# Patient Record
Sex: FEMALE | Race: AMERICAN INDIAN OR ALASKA NATIVE | NOT HISPANIC OR LATINO | Employment: UNEMPLOYED | URBAN - NONMETROPOLITAN AREA
[De-identification: names, ages, dates, MRNs, and addresses within clinical notes are randomized per-mention and may not be internally consistent; named-entity substitution may affect disease eponyms.]

---
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
## 2023-01-03 ENCOUNTER — HOSPITAL ENCOUNTER (INPATIENT)
Facility: MEDICAL CENTER | Age: 18
LOS: 1 days | DRG: 694 | End: 2023-01-04
Attending: HOSPITALIST | Admitting: HOSPITALIST
Payer: MEDICAID

## 2023-01-03 DIAGNOSIS — N20.1 URETEROLITHIASIS: ICD-10-CM

## 2023-01-03 PROCEDURE — 770001 HCHG ROOM/CARE - MED/SURG/GYN PRIV*

## 2023-01-03 PROCEDURE — 99223 1ST HOSP IP/OBS HIGH 75: CPT | Performed by: HOSPITALIST

## 2023-01-03 RX ORDER — SODIUM CHLORIDE, SODIUM LACTATE, POTASSIUM CHLORIDE, CALCIUM CHLORIDE 600; 310; 30; 20 MG/100ML; MG/100ML; MG/100ML; MG/100ML
INJECTION, SOLUTION INTRAVENOUS CONTINUOUS
Status: DISCONTINUED | OUTPATIENT
Start: 2023-01-04 | End: 2023-01-04 | Stop reason: HOSPADM

## 2023-01-03 RX ORDER — PROCHLORPERAZINE EDISYLATE 5 MG/ML
5-10 INJECTION INTRAMUSCULAR; INTRAVENOUS EVERY 4 HOURS PRN
Status: DISCONTINUED | OUTPATIENT
Start: 2023-01-03 | End: 2023-01-04 | Stop reason: HOSPADM

## 2023-01-03 RX ORDER — POLYETHYLENE GLYCOL 3350 17 G/17G
1 POWDER, FOR SOLUTION ORAL
Status: DISCONTINUED | OUTPATIENT
Start: 2023-01-03 | End: 2023-01-04 | Stop reason: HOSPADM

## 2023-01-03 RX ORDER — AMOXICILLIN 250 MG
2 CAPSULE ORAL 2 TIMES DAILY
Status: DISCONTINUED | OUTPATIENT
Start: 2023-01-04 | End: 2023-01-04 | Stop reason: HOSPADM

## 2023-01-03 RX ORDER — ONDANSETRON 4 MG/1
4 TABLET, ORALLY DISINTEGRATING ORAL EVERY 4 HOURS PRN
Status: DISCONTINUED | OUTPATIENT
Start: 2023-01-03 | End: 2023-01-04 | Stop reason: HOSPADM

## 2023-01-03 RX ORDER — ONDANSETRON 2 MG/ML
4 INJECTION INTRAMUSCULAR; INTRAVENOUS EVERY 4 HOURS PRN
Status: DISCONTINUED | OUTPATIENT
Start: 2023-01-03 | End: 2023-01-04 | Stop reason: HOSPADM

## 2023-01-03 RX ORDER — TAMSULOSIN HYDROCHLORIDE 0.4 MG/1
0.4 CAPSULE ORAL
Status: DISCONTINUED | OUTPATIENT
Start: 2023-01-04 | End: 2023-01-04 | Stop reason: HOSPADM

## 2023-01-03 RX ORDER — PROMETHAZINE HYDROCHLORIDE 25 MG/1
12.5-25 TABLET ORAL EVERY 4 HOURS PRN
Status: DISCONTINUED | OUTPATIENT
Start: 2023-01-03 | End: 2023-01-04 | Stop reason: HOSPADM

## 2023-01-03 RX ORDER — PROMETHAZINE HYDROCHLORIDE 12.5 MG/1
12.5-25 SUPPOSITORY RECTAL EVERY 4 HOURS PRN
Status: DISCONTINUED | OUTPATIENT
Start: 2023-01-03 | End: 2023-01-04 | Stop reason: HOSPADM

## 2023-01-03 RX ORDER — ACETAMINOPHEN 325 MG/1
650 TABLET ORAL EVERY 6 HOURS PRN
Status: DISCONTINUED | OUTPATIENT
Start: 2023-01-03 | End: 2023-01-04 | Stop reason: HOSPADM

## 2023-01-03 RX ORDER — BISACODYL 10 MG
10 SUPPOSITORY, RECTAL RECTAL
Status: DISCONTINUED | OUTPATIENT
Start: 2023-01-03 | End: 2023-01-04 | Stop reason: HOSPADM

## 2023-01-03 ASSESSMENT — ENCOUNTER SYMPTOMS
VOMITING: 0
FEVER: 0
CHILLS: 0
NAUSEA: 1
FLANK PAIN: 1

## 2023-01-03 ASSESSMENT — COGNITIVE AND FUNCTIONAL STATUS - GENERAL
SUGGESTED CMS G CODE MODIFIER MOBILITY: CH
DAILY ACTIVITIY SCORE: 24
SUGGESTED CMS G CODE MODIFIER DAILY ACTIVITY: CH
MOBILITY SCORE: 24

## 2023-01-03 ASSESSMENT — LIFESTYLE VARIABLES
ALCOHOL_USE: NO
ON A TYPICAL DAY WHEN YOU DRINK ALCOHOL HOW MANY DRINKS DO YOU HAVE: 0
AVERAGE NUMBER OF DAYS PER WEEK YOU HAVE A DRINK CONTAINING ALCOHOL: 0
CONSUMPTION TOTAL: NEGATIVE
HAVE PEOPLE ANNOYED YOU BY CRITICIZING YOUR DRINKING: NO
EVER HAD A DRINK FIRST THING IN THE MORNING TO STEADY YOUR NERVES TO GET RID OF A HANGOVER: NO
EVER FELT BAD OR GUILTY ABOUT YOUR DRINKING: NO
TOTAL SCORE: 0
HAVE YOU EVER FELT YOU SHOULD CUT DOWN ON YOUR DRINKING: NO
HOW MANY TIMES IN THE PAST YEAR HAVE YOU HAD 5 OR MORE DRINKS IN A DAY: 0

## 2023-01-03 ASSESSMENT — PATIENT HEALTH QUESTIONNAIRE - PHQ9
1. LITTLE INTEREST OR PLEASURE IN DOING THINGS: NOT AT ALL
SUM OF ALL RESPONSES TO PHQ9 QUESTIONS 1 AND 2: 0
2. FEELING DOWN, DEPRESSED, IRRITABLE, OR HOPELESS: NOT AT ALL

## 2023-01-04 ENCOUNTER — APPOINTMENT (OUTPATIENT)
Dept: RADIOLOGY | Facility: MEDICAL CENTER | Age: 18
DRG: 694 | End: 2023-01-04
Attending: HOSPITALIST
Payer: MEDICAID

## 2023-01-04 ENCOUNTER — HOSPITAL ENCOUNTER (OUTPATIENT)
Dept: RADIOLOGY | Facility: MEDICAL CENTER | Age: 18
End: 2023-01-04

## 2023-01-04 ENCOUNTER — APPOINTMENT (OUTPATIENT)
Dept: RADIOLOGY | Facility: MEDICAL CENTER | Age: 18
DRG: 694 | End: 2023-01-04
Attending: INTERNAL MEDICINE
Payer: MEDICAID

## 2023-01-04 VITALS
TEMPERATURE: 97.8 F | OXYGEN SATURATION: 97 % | BODY MASS INDEX: 27.72 KG/M2 | RESPIRATION RATE: 18 BRPM | SYSTOLIC BLOOD PRESSURE: 110 MMHG | WEIGHT: 176.59 LBS | HEIGHT: 67 IN | HEART RATE: 72 BPM | DIASTOLIC BLOOD PRESSURE: 56 MMHG

## 2023-01-04 PROBLEM — N20.1 URETEROLITHIASIS: Status: RESOLVED | Noted: 2023-01-03 | Resolved: 2023-01-04

## 2023-01-04 LAB
ALBUMIN SERPL BCP-MCNC: 4.1 G/DL (ref 3.2–4.9)
ALBUMIN/GLOB SERPL: 1.8 G/DL
ALP SERPL-CCNC: 159 U/L (ref 30–99)
ALT SERPL-CCNC: 19 U/L (ref 2–50)
ANION GAP SERPL CALC-SCNC: 10 MMOL/L (ref 7–16)
APPEARANCE UR: CLEAR
AST SERPL-CCNC: 14 U/L (ref 12–45)
BACTERIA #/AREA URNS HPF: NEGATIVE /HPF
BASOPHILS # BLD AUTO: 0.8 % (ref 0–1.8)
BASOPHILS # BLD: 0.07 K/UL (ref 0–0.12)
BILIRUB SERPL-MCNC: 0.5 MG/DL (ref 0.1–1.5)
BILIRUB UR QL STRIP.AUTO: NEGATIVE
BUN SERPL-MCNC: 7 MG/DL (ref 8–22)
CALCIUM ALBUM COR SERPL-MCNC: 9 MG/DL (ref 8.5–10.5)
CALCIUM SERPL-MCNC: 9.1 MG/DL (ref 8.5–10.5)
CHLORIDE SERPL-SCNC: 106 MMOL/L (ref 96–112)
CO2 SERPL-SCNC: 23 MMOL/L (ref 20–33)
COLOR UR: YELLOW
CREAT SERPL-MCNC: 0.55 MG/DL (ref 0.5–1.4)
EOSINOPHIL # BLD AUTO: 0.08 K/UL (ref 0–0.51)
EOSINOPHIL NFR BLD: 0.9 % (ref 0–6.9)
EPI CELLS #/AREA URNS HPF: NEGATIVE /HPF
ERYTHROCYTE [DISTWIDTH] IN BLOOD BY AUTOMATED COUNT: 42 FL (ref 35.9–50)
GFR SERPLBLD CREATININE-BSD FMLA CKD-EPI: 135 ML/MIN/1.73 M 2
GLOBULIN SER CALC-MCNC: 2.3 G/DL (ref 1.9–3.5)
GLUCOSE SERPL-MCNC: 88 MG/DL (ref 65–99)
GLUCOSE UR STRIP.AUTO-MCNC: NEGATIVE MG/DL
HCT VFR BLD AUTO: 38.2 % (ref 37–47)
HGB BLD-MCNC: 13 G/DL (ref 12–16)
HYALINE CASTS #/AREA URNS LPF: ABNORMAL /LPF
IMM GRANULOCYTES # BLD AUTO: 0.04 K/UL (ref 0–0.11)
IMM GRANULOCYTES NFR BLD AUTO: 0.4 % (ref 0–0.9)
KETONES UR STRIP.AUTO-MCNC: NEGATIVE MG/DL
LEUKOCYTE ESTERASE UR QL STRIP.AUTO: NEGATIVE
LYMPHOCYTES # BLD AUTO: 1.85 K/UL (ref 1–4.8)
LYMPHOCYTES NFR BLD: 20.6 % (ref 22–41)
MCH RBC QN AUTO: 28.8 PG (ref 27–33)
MCHC RBC AUTO-ENTMCNC: 34 G/DL (ref 33.6–35)
MCV RBC AUTO: 84.5 FL (ref 81.4–97.8)
MICRO URNS: ABNORMAL
MONOCYTES # BLD AUTO: 0.47 K/UL (ref 0–0.85)
MONOCYTES NFR BLD AUTO: 5.2 % (ref 0–13.4)
NEUTROPHILS # BLD AUTO: 6.45 K/UL (ref 2–7.15)
NEUTROPHILS NFR BLD: 72.1 % (ref 44–72)
NITRITE UR QL STRIP.AUTO: NEGATIVE
NRBC # BLD AUTO: 0 K/UL
NRBC BLD-RTO: 0 /100 WBC
PH UR STRIP.AUTO: 6 [PH] (ref 5–8)
PLATELET # BLD AUTO: 323 K/UL (ref 164–446)
PMV BLD AUTO: 9.3 FL (ref 9–12.9)
POTASSIUM SERPL-SCNC: 3.8 MMOL/L (ref 3.6–5.5)
PROT SERPL-MCNC: 6.4 G/DL (ref 6–8.2)
PROT UR QL STRIP: NEGATIVE MG/DL
RBC # BLD AUTO: 4.52 M/UL (ref 4.2–5.4)
RBC # URNS HPF: ABNORMAL /HPF
RBC UR QL AUTO: ABNORMAL
SODIUM SERPL-SCNC: 139 MMOL/L (ref 135–145)
SP GR UR STRIP.AUTO: 1.01
UROBILINOGEN UR STRIP.AUTO-MCNC: 0.2 MG/DL
WBC # BLD AUTO: 9 K/UL (ref 4.8–10.8)
WBC #/AREA URNS HPF: ABNORMAL /HPF

## 2023-01-04 PROCEDURE — 700105 HCHG RX REV CODE 258: Performed by: HOSPITALIST

## 2023-01-04 PROCEDURE — 99239 HOSP IP/OBS DSCHRG MGMT >30: CPT | Performed by: INTERNAL MEDICINE

## 2023-01-04 PROCEDURE — 80053 COMPREHEN METABOLIC PANEL: CPT

## 2023-01-04 PROCEDURE — 81001 URINALYSIS AUTO W/SCOPE: CPT

## 2023-01-04 PROCEDURE — 74018 RADEX ABDOMEN 1 VIEW: CPT

## 2023-01-04 PROCEDURE — 76775 US EXAM ABDO BACK WALL LIM: CPT

## 2023-01-04 PROCEDURE — 85025 COMPLETE CBC W/AUTO DIFF WBC: CPT

## 2023-01-04 PROCEDURE — 36415 COLL VENOUS BLD VENIPUNCTURE: CPT

## 2023-01-04 PROCEDURE — 700102 HCHG RX REV CODE 250 W/ 637 OVERRIDE(OP): Performed by: HOSPITALIST

## 2023-01-04 PROCEDURE — A9270 NON-COVERED ITEM OR SERVICE: HCPCS | Performed by: HOSPITALIST

## 2023-01-04 RX ORDER — IBUPROFEN 600 MG/1
600 TABLET ORAL EVERY 6 HOURS PRN
Qty: 30 TABLET | Refills: 0 | Status: SHIPPED | OUTPATIENT
Start: 2023-01-04

## 2023-01-04 RX ORDER — TAMSULOSIN HYDROCHLORIDE 0.4 MG/1
0.4 CAPSULE ORAL
Qty: 10 CAPSULE | Refills: 0 | Status: SHIPPED | OUTPATIENT
Start: 2023-01-05

## 2023-01-04 RX ADMIN — TAMSULOSIN HYDROCHLORIDE 0.4 MG: 0.4 CAPSULE ORAL at 08:25

## 2023-01-04 RX ADMIN — SODIUM CHLORIDE, POTASSIUM CHLORIDE, SODIUM LACTATE AND CALCIUM CHLORIDE: 600; 310; 30; 20 INJECTION, SOLUTION INTRAVENOUS at 00:11

## 2023-01-04 RX ADMIN — SODIUM CHLORIDE, POTASSIUM CHLORIDE, SODIUM LACTATE AND CALCIUM CHLORIDE: 600; 310; 30; 20 INJECTION, SOLUTION INTRAVENOUS at 08:26

## 2023-01-04 NOTE — PROGRESS NOTES
Ms. Baldwin has a 5.6 cm right mid-ureteral stone with hydronephrosis. She apparently took pyridium and her UA is unable to be processed by the lab. Her WBC is 10.3. She is being given abx and pain meds as well as IV fluids. She will be sent for urology consult. I discussed with Dr. Michaels the ER doc in [place].

## 2023-01-04 NOTE — ASSESSMENT & PLAN NOTE
CT in [place] revealed a 5.3 mm partially obstructing stone.  UA there was not able to be read (lab error?) and will be repeated here. She was given IV antibiotics in [place].  If the UA is positive here then antibiotics will be continued.  She currently doesn't have any right flank pain which is suspicious for a passed stone.   Her urine will be filtered to see if she passes the stone  flomax   Renal ultrasound ordered to eval  NPO midnight in case the stone needs to be extracted by urology who will be consulted in the morning.  IV fluids

## 2023-01-04 NOTE — H&P
Hospital Medicine History & Physical Note    Date of Service  1/3/2023    Primary Care Physician  Pcp Pt States None    Consultants  None.  Urology to be consulted in the morning    Code Status  Full Code    Chief Complaint  ight flank pain.    History of Presenting Illness  Michael Baldwin is a 17 y.o. female who presented 1/3/2023 with right flank pain.  Ms. Baldwin has no previously known medical conditions that has been her usual state of excellent health till this afternoon around 2 or 3 she developed cute onset of right flank pain.  She went to the emergency room in [facility] where a CT scan revealed a partially obstructing 5.38 mm right distal ureteral stone.  Her white blood cell count was 10.3 and her creatinine was 0.67.  Her beta hCG was negative.  A urinalysis was done there and apparently because of a lab malfunction was not able to be read.  They empirically gave her IV antibiotics and she required substantial amounts of IV narcotics for pain control.  She was transferred here for higher level care and possible urology consultation for stone extraction.  When Ms. Baldwin arrived here she has no right flank pain and no tenderness to percussion on the right side.  This is suspicious for a passed stone.  We will repeat her urinalysis here and consider antibiotics if indicated.  She will be treated with IV fluids and a renal ultrasound has been ordered to evaluate if the stone remains.      I discussed the plan of care with bedside RN.    Review of Systems  Review of Systems   Constitutional:  Negative for chills and fever.   Gastrointestinal:  Positive for nausea. Negative for vomiting.   Genitourinary:  Positive for flank pain. Negative for dysuria, frequency, hematuria and urgency.   All other systems reviewed and are negative.    Past Medical History  She has no known medical conditions    Surgical History  [place] teeth removal    Family History  No family history of kidney stones to her  knowledge    Social History   reports that she has never smoked. She has never used smokeless tobacco.    Allergies  No Known Allergies    Medications  None       Physical Exam  Temp:  [36.4 °C (97.5 °F)] 36.4 °C (97.5 °F)  Pulse:  [60] 60  Resp:  [18] 18  BP: (115)/(59) 115/59  SpO2:  [98 %] 98 %  Blood Pressure: 115/59   Temperature: 36.4 °C (97.5 °F)   Pulse: 60   Respiration: 18   Pulse Oximetry: 98 %       Physical Exam  Vitals and nursing note reviewed.   Constitutional:       General: She is not in acute distress.     Appearance: She is not ill-appearing or toxic-appearing.   HENT:      Head: Normocephalic and atraumatic.      Mouth/Throat:      Mouth: Mucous membranes are dry.      Pharynx: Oropharynx is clear.   Eyes:      General: No scleral icterus.     Conjunctiva/sclera: Conjunctivae normal.   Cardiovascular:      Rate and Rhythm: Normal rate and regular rhythm.      Heart sounds: No murmur heard.  Pulmonary:      Effort: Pulmonary effort is normal.      Breath sounds: Normal breath sounds.   Abdominal:      General: There is no distension.      Tenderness: There is no abdominal tenderness.      Comments: No flank pain to percussion   Musculoskeletal:      Cervical back: Normal range of motion and neck supple.      Right lower leg: No edema.      Left lower leg: No edema.   Skin:     General: Skin is warm and dry.   Neurological:      General: No focal deficit present.      Mental Status: She is alert and oriented to person, place, and time.   Psychiatric:         Mood and Affect: Mood normal.         Behavior: Behavior normal.         Thought Content: Thought content normal.         Judgment: Judgment normal.       Laboratory:          No results for input(s): ALTSGPT, ASTSGOT, ALKPHOSPHAT, TBILIRUBIN, DBILIRUBIN, GAMMAGT, AMYLASE, LIPASE, ALB, PREALBUMIN, GLUCOSE in the last 72 hours.      No results for input(s): NTPROBNP in the last 72 hours.      No results for input(s): TROPONINT in the last 72  hours.    Imaging:  US-RENAL    (Results Pending)       \    Assessment/Plan:  Justification for Admission Status  I anticipate this patient will require at least two midnights for appropriate medical management, necessitating inpatient admission because further work-up for ureteral stone with hydronephrosis possible UTI    Patient will need a Med/Surg bed on MEDICAL service .  The need is secondary to as above.    * Ureterolithiasis- (present on admission)  Assessment & Plan  CT in [place] revealed a 5.3 mm partially obstructing stone.  UA there was not able to be read (lab error?) and will be repeated here. She was given IV antibiotics in [place].  If the UA is positive here then antibiotics will be continued.  She currently doesn't have any right flank pain which is suspicious for a passed stone.   Her urine will be filtered to see if she passes the stone  flomax   Renal ultrasound ordered to eval  NPO midnight in case the stone needs to be extracted by urology who will be consulted in the morning.  IV fluids        VTE prophylaxis: SCDs/TEDs

## 2023-01-04 NOTE — PROGRESS NOTES
Received bedside report from night shift RN.   Assumed care of patient at change of shift.   Assessment complete and POC discussed.   STATUS: Patient is A&Ox4, VSS, on RA.   PAIN: Patient denies pain, no apparent signs of distress or discomfort.   Patient issitting up in bed, watching videos on her phone.   Bed is in lowest/locked position.   Call light and belongings are within reach.   No further needs at this time.  Will continue to monitor.

## 2023-01-04 NOTE — PROGRESS NOTES
4 Eyes Skin Assessment Completed by LANDEN bustillos and LANDEN bailey.    Head WDL  Ears WDL  Nose WDL  Mouth WDL  Neck WDL  Breast/Chest WDL  Shoulder Blades WDL  Spine WDL  (R) Arm/Elbow/Hand WDL  (L) Arm/Elbow/Hand WDL  Abdomen WDL  Groin WDL  Scrotum/Coccyx/Buttocks WDL  (R) Leg WDL  (L) Leg WDL  (R) Heel/Foot/Toe WDL  (L) Heel/Foot/Toe WDL          Devices In Places       Interventions In Place Pillows    Possible Skin Injury No    Pictures Uploaded Into Epic N/A  Wound Consult Placed N/A  RN Wound Prevention Protocol Ordered No

## 2023-01-04 NOTE — PROGRESS NOTES
Direct admit from [facility] via gurney and randal.    Alert and able to let her needs known, up self with a steady gait.  No current pain but did get medicated prior to arrival  Denies painful urination and unable to state color of urine  Notified dr garcia of arrival, await orders

## 2023-01-04 NOTE — CARE PLAN
The patient is Stable - Low risk of patient condition declining or worsening    Shift Goals  Clinical Goals: IVF, monitor for ABD pain  Patient Goals: feel better, go home    Progress made toward(s) clinical / shift goals:  yes    Patient is not progressing towards the following goals:

## 2023-01-04 NOTE — DISCHARGE SUMMARY
Discharge Summary    CHIEF COMPLAINT ON ADMISSION  No chief complaint on file.      Reason for Admission  Nephrolithiasis     Admission Date  1/3/2023    CODE STATUS  Full Code    HPI & HOSPITAL COURSE  This is a 17 y.o. female here with right flank pain.  She was seen at the ER in [place] where she underwent a CT scan that revealed a partially obstructing 5.38 mm right distal ureteral stone.  Her white cell count was 10.3 and her kidney function was within normal limits.  Her beta hCG was negative.  She was given empiric IV antibiotics and IV narcotics and transferred to [facility] for urology consultation.  On arrival to [facility] patient's right flank pain had resolved.  She underwent a UA here which was negative for infection.  She underwent a renal ultrasound that revealed no significant hydronephrosis.  She underwent a x-ray KUB that did not reveal previously described right ureteral stone.  Patient symptoms have resolved and she is stable.  She will be discharged home with close outpatient follow-up with her PCP.  She has been given a 10-day course of Flomax to help with expulsion of the stone.      Therefore, she is discharged in good and stable condition to home with close outpatient follow-up.    The patient recovered much more quickly than anticipated on admission.    Discharge Date  1/4/23    FOLLOW UP ITEMS POST DISCHARGE  Follow up with PCP    DISCHARGE DIAGNOSES  Principal Problem (Resolved):    Ureterolithiasis POA: Yes  Active Problems:    * No active hospital problems. *      FOLLOW UP  No future appointments.  No follow-up provider specified.    MEDICATIONS ON DISCHARGE     Medication List        START taking these medications        Instructions   ibuprofen 600 MG Tabs  Commonly known as: MOTRIN   Take 1 Tablet by mouth every 6 hours as needed for Mild Pain.  Dose: 600 mg     tamsulosin 0.4 MG capsule  Start taking on: January 5, 2023  Commonly known as: FLOMAX   Take 1 Capsule by mouth 1/2 hour after  breakfast.  Dose: 0.4 mg              Allergies  No Known Allergies    DIET  Orders Placed This Encounter   Procedures    Diet NPO Restrict to: Sips with Medications     Standing Status:   Standing     Number of Occurrences:   8     Order Specific Question:   Diet NPO Restrict to:     Answer:   Sips with Medications [3]       ACTIVITY  As tolerated.  Weight bearing as tolerated    CONSULTATIONS  none    PROCEDURES  none    LABORATORY  Lab Results   Component Value Date    SODIUM 139 01/04/2023    POTASSIUM 3.8 01/04/2023    CHLORIDE 106 01/04/2023    CO2 23 01/04/2023    GLUCOSE 88 01/04/2023    BUN 7 (L) 01/04/2023    CREATININE 0.55 01/04/2023        Lab Results   Component Value Date    WBC 9.0 01/04/2023    HEMOGLOBIN 13.0 01/04/2023    HEMATOCRIT 38.2 01/04/2023    PLATELETCT 323 01/04/2023        Total time of the discharge process exceeds 34 minutes.

## 2024-04-20 ENCOUNTER — ANESTHESIA (OUTPATIENT)
Dept: ANESTHESIOLOGY | Facility: MEDICAL CENTER | Age: 19
End: 2024-04-20
Payer: MEDICAID

## 2024-04-20 ENCOUNTER — ANESTHESIA EVENT (OUTPATIENT)
Dept: ANESTHESIOLOGY | Facility: MEDICAL CENTER | Age: 19
End: 2024-04-20
Payer: MEDICAID

## 2024-04-20 ENCOUNTER — HOSPITAL ENCOUNTER (INPATIENT)
Facility: MEDICAL CENTER | Age: 19
LOS: 2 days | End: 2024-04-22
Attending: OBSTETRICS & GYNECOLOGY | Admitting: OBSTETRICS & GYNECOLOGY
Payer: MEDICAID

## 2024-04-20 LAB
BASOPHILS # BLD AUTO: 0.3 % (ref 0–1.8)
BASOPHILS # BLD: 0.05 K/UL (ref 0–0.12)
EOSINOPHIL # BLD AUTO: 0.12 K/UL (ref 0–0.51)
EOSINOPHIL NFR BLD: 0.7 % (ref 0–6.9)
ERYTHROCYTE [DISTWIDTH] IN BLOOD BY AUTOMATED COUNT: 53.3 FL (ref 35.9–50)
HCT VFR BLD AUTO: 37.1 % (ref 37–47)
HGB BLD-MCNC: 12.2 G/DL (ref 12–16)
HOLDING TUBE BB 8507: NORMAL
IMM GRANULOCYTES # BLD AUTO: 0.07 K/UL (ref 0–0.11)
IMM GRANULOCYTES NFR BLD AUTO: 0.4 % (ref 0–0.9)
LYMPHOCYTES # BLD AUTO: 1.28 K/UL (ref 1–4.8)
LYMPHOCYTES NFR BLD: 7.4 % (ref 22–41)
MCH RBC QN AUTO: 27.4 PG (ref 27–33)
MCHC RBC AUTO-ENTMCNC: 32.9 G/DL (ref 32.2–35.5)
MCV RBC AUTO: 83.4 FL (ref 81.4–97.8)
MONOCYTES # BLD AUTO: 0.83 K/UL (ref 0–0.85)
MONOCYTES NFR BLD AUTO: 4.8 % (ref 0–13.4)
NEUTROPHILS # BLD AUTO: 14.96 K/UL (ref 1.82–7.42)
NEUTROPHILS NFR BLD: 86.4 % (ref 44–72)
NRBC # BLD AUTO: 0 K/UL
NRBC BLD-RTO: 0 /100 WBC (ref 0–0.2)
PLATELET # BLD AUTO: 296 K/UL (ref 164–446)
PMV BLD AUTO: 9.7 FL (ref 9–12.9)
RBC # BLD AUTO: 4.45 M/UL (ref 4.2–5.4)
T PALLIDUM AB SER QL IA: NORMAL
WBC # BLD AUTO: 17.3 K/UL (ref 4.8–10.8)

## 2024-04-20 PROCEDURE — 770002 HCHG ROOM/CARE - OB PRIVATE (112)

## 2024-04-20 PROCEDURE — 99282 EMERGENCY DEPT VISIT SF MDM: CPT

## 2024-04-20 PROCEDURE — 86780 TREPONEMA PALLIDUM: CPT

## 2024-04-20 PROCEDURE — 700101 HCHG RX REV CODE 250: Performed by: ANESTHESIOLOGY

## 2024-04-20 PROCEDURE — 36415 COLL VENOUS BLD VENIPUNCTURE: CPT

## 2024-04-20 PROCEDURE — 700111 HCHG RX REV CODE 636 W/ 250 OVERRIDE (IP): Performed by: ANESTHESIOLOGY

## 2024-04-20 PROCEDURE — 85025 COMPLETE CBC W/AUTO DIFF WBC: CPT

## 2024-04-20 PROCEDURE — 700105 HCHG RX REV CODE 258: Performed by: ADVANCED PRACTICE MIDWIFE

## 2024-04-20 PROCEDURE — 700105 HCHG RX REV CODE 258: Performed by: ANESTHESIOLOGY

## 2024-04-20 RX ORDER — ACETAMINOPHEN 325 MG/1
650 TABLET ORAL EVERY 4 HOURS PRN
Status: ON HOLD | COMMUNITY
End: 2024-04-22

## 2024-04-20 RX ORDER — EPHEDRINE SULFATE 50 MG/ML
5 INJECTION, SOLUTION INTRAVENOUS
Status: DISCONTINUED | OUTPATIENT
Start: 2024-04-20 | End: 2024-04-21 | Stop reason: HOSPADM

## 2024-04-20 RX ORDER — IBUPROFEN 800 MG/1
800 TABLET ORAL
Status: COMPLETED | OUTPATIENT
Start: 2024-04-20 | End: 2024-04-21

## 2024-04-20 RX ORDER — SODIUM CHLORIDE, SODIUM LACTATE, POTASSIUM CHLORIDE, AND CALCIUM CHLORIDE .6; .31; .03; .02 G/100ML; G/100ML; G/100ML; G/100ML
1000 INJECTION, SOLUTION INTRAVENOUS
Status: COMPLETED | OUTPATIENT
Start: 2024-04-20 | End: 2024-04-20

## 2024-04-20 RX ORDER — LIDOCAINE HYDROCHLORIDE AND EPINEPHRINE 15; 5 MG/ML; UG/ML
INJECTION, SOLUTION EPIDURAL
Status: COMPLETED | OUTPATIENT
Start: 2024-04-20 | End: 2024-04-20

## 2024-04-20 RX ORDER — ONDANSETRON 4 MG/1
4 TABLET, ORALLY DISINTEGRATING ORAL EVERY 6 HOURS PRN
Status: DISCONTINUED | OUTPATIENT
Start: 2024-04-20 | End: 2024-04-21 | Stop reason: HOSPADM

## 2024-04-20 RX ORDER — SODIUM CHLORIDE, SODIUM LACTATE, POTASSIUM CHLORIDE, AND CALCIUM CHLORIDE .6; .31; .03; .02 G/100ML; G/100ML; G/100ML; G/100ML
250 INJECTION, SOLUTION INTRAVENOUS PRN
Status: DISCONTINUED | OUTPATIENT
Start: 2024-04-20 | End: 2024-04-21 | Stop reason: HOSPADM

## 2024-04-20 RX ORDER — ACETAMINOPHEN 500 MG
1000 TABLET ORAL
Status: DISCONTINUED | OUTPATIENT
Start: 2024-04-20 | End: 2024-04-21 | Stop reason: HOSPADM

## 2024-04-20 RX ORDER — BUPIVACAINE HYDROCHLORIDE 2.5 MG/ML
INJECTION, SOLUTION EPIDURAL; INFILTRATION; INTRACAUDAL
Status: COMPLETED | OUTPATIENT
Start: 2024-04-20 | End: 2024-04-20

## 2024-04-20 RX ORDER — ROPIVACAINE HYDROCHLORIDE 2 MG/ML
INJECTION, SOLUTION EPIDURAL; INFILTRATION; PERINEURAL CONTINUOUS
Status: DISCONTINUED | OUTPATIENT
Start: 2024-04-20 | End: 2024-04-22 | Stop reason: HOSPADM

## 2024-04-20 RX ORDER — OXYTOCIN 10 [USP'U]/ML
10 INJECTION, SOLUTION INTRAMUSCULAR; INTRAVENOUS
Status: DISCONTINUED | OUTPATIENT
Start: 2024-04-20 | End: 2024-04-21 | Stop reason: HOSPADM

## 2024-04-20 RX ORDER — SODIUM CHLORIDE, SODIUM LACTATE, POTASSIUM CHLORIDE, CALCIUM CHLORIDE 600; 310; 30; 20 MG/100ML; MG/100ML; MG/100ML; MG/100ML
INJECTION, SOLUTION INTRAVENOUS CONTINUOUS
Status: DISCONTINUED | OUTPATIENT
Start: 2024-04-20 | End: 2024-04-22 | Stop reason: HOSPADM

## 2024-04-20 RX ORDER — ONDANSETRON 2 MG/ML
4 INJECTION INTRAMUSCULAR; INTRAVENOUS EVERY 6 HOURS PRN
Status: DISCONTINUED | OUTPATIENT
Start: 2024-04-20 | End: 2024-04-21 | Stop reason: HOSPADM

## 2024-04-20 RX ORDER — TERBUTALINE SULFATE 1 MG/ML
0.25 INJECTION, SOLUTION SUBCUTANEOUS
Status: DISCONTINUED | OUTPATIENT
Start: 2024-04-20 | End: 2024-04-21 | Stop reason: HOSPADM

## 2024-04-20 RX ORDER — LIDOCAINE HYDROCHLORIDE 10 MG/ML
20 INJECTION, SOLUTION INFILTRATION; PERINEURAL
Status: DISCONTINUED | OUTPATIENT
Start: 2024-04-20 | End: 2024-04-21 | Stop reason: HOSPADM

## 2024-04-20 RX ORDER — BUPIVACAINE HYDROCHLORIDE 2.5 MG/ML
INJECTION, SOLUTION EPIDURAL; INFILTRATION; INTRACAUDAL
Status: COMPLETED
Start: 2024-04-20 | End: 2024-04-20

## 2024-04-20 RX ADMIN — SODIUM CHLORIDE, POTASSIUM CHLORIDE, SODIUM LACTATE AND CALCIUM CHLORIDE 1000 ML: 600; 310; 30; 20 INJECTION, SOLUTION INTRAVENOUS at 21:25

## 2024-04-20 RX ADMIN — FENTANYL CITRATE 100 MCG: 50 INJECTION, SOLUTION INTRAMUSCULAR; INTRAVENOUS at 21:46

## 2024-04-20 RX ADMIN — SODIUM CHLORIDE, POTASSIUM CHLORIDE, SODIUM LACTATE AND CALCIUM CHLORIDE: 600; 310; 30; 20 INJECTION, SOLUTION INTRAVENOUS at 21:54

## 2024-04-20 RX ADMIN — ROPIVACAINE HYDROCHLORIDE: 2 INJECTION, SOLUTION EPIDURAL; INFILTRATION; PERINEURAL at 21:41

## 2024-04-20 RX ADMIN — LIDOCAINE HYDROCHLORIDE,EPINEPHRINE BITARTRATE 3 ML: 15; .005 INJECTION, SOLUTION EPIDURAL; INFILTRATION; INTRACAUDAL; PERINEURAL at 21:40

## 2024-04-20 RX ADMIN — BUPIVACAINE HYDROCHLORIDE 5 ML: 2.5 INJECTION, SOLUTION EPIDURAL; INFILTRATION; INTRACAUDAL at 21:46

## 2024-04-20 ASSESSMENT — LIFESTYLE VARIABLES
HAVE PEOPLE ANNOYED YOU BY CRITICIZING YOUR DRINKING: NO
HAVE YOU EVER FELT YOU SHOULD CUT DOWN ON YOUR DRINKING: NO
ALCOHOL_USE: NO
AVERAGE NUMBER OF DAYS PER WEEK YOU HAVE A DRINK CONTAINING ALCOHOL: 0
ON A TYPICAL DAY WHEN YOU DRINK ALCOHOL HOW MANY DRINKS DO YOU HAVE: 0
EVER FELT BAD OR GUILTY ABOUT YOUR DRINKING: NO
HOW MANY TIMES IN THE PAST YEAR HAVE YOU HAD 5 OR MORE DRINKS IN A DAY: 0
TOTAL SCORE: 0
CONSUMPTION TOTAL: NEGATIVE
DOES PATIENT WANT TO STOP DRINKING: NO
EVER HAD A DRINK FIRST THING IN THE MORNING TO STEADY YOUR NERVES TO GET RID OF A HANGOVER: NO
EVER_SMOKED: NEVER
TOTAL SCORE: 0
TOTAL SCORE: 0

## 2024-04-20 ASSESSMENT — PATIENT HEALTH QUESTIONNAIRE - PHQ9
1. LITTLE INTEREST OR PLEASURE IN DOING THINGS: NOT AT ALL
2. FEELING DOWN, DEPRESSED, IRRITABLE, OR HOPELESS: NOT AT ALL
SUM OF ALL RESPONSES TO PHQ9 QUESTIONS 1 AND 2: 0

## 2024-04-20 ASSESSMENT — FIBROSIS 4 INDEX: FIB4 SCORE: 0.21

## 2024-04-20 ASSESSMENT — PAIN DESCRIPTION - PAIN TYPE: TYPE: ACUTE PAIN

## 2024-04-21 PROCEDURE — 770002 HCHG ROOM/CARE - OB PRIVATE (112)

## 2024-04-21 PROCEDURE — 59409 OBSTETRICAL CARE: CPT

## 2024-04-21 PROCEDURE — 304965 HCHG RECOVERY SERVICES

## 2024-04-21 PROCEDURE — 700111 HCHG RX REV CODE 636 W/ 250 OVERRIDE (IP): Performed by: ADVANCED PRACTICE MIDWIFE

## 2024-04-21 PROCEDURE — 700105 HCHG RX REV CODE 258: Performed by: ADVANCED PRACTICE MIDWIFE

## 2024-04-21 PROCEDURE — 700102 HCHG RX REV CODE 250 W/ 637 OVERRIDE(OP): Performed by: ADVANCED PRACTICE MIDWIFE

## 2024-04-21 PROCEDURE — A9270 NON-COVERED ITEM OR SERVICE: HCPCS | Performed by: ADVANCED PRACTICE MIDWIFE

## 2024-04-21 PROCEDURE — 303615 HCHG EPIDURAL/SPINAL ANESTHESIA FOR LABOR

## 2024-04-21 PROCEDURE — 59409 OBSTETRICAL CARE: CPT | Performed by: ADVANCED PRACTICE MIDWIFE

## 2024-04-21 RX ORDER — DOCUSATE SODIUM 100 MG/1
100 CAPSULE, LIQUID FILLED ORAL 2 TIMES DAILY PRN
Status: DISCONTINUED | OUTPATIENT
Start: 2024-04-21 | End: 2024-04-22 | Stop reason: HOSPADM

## 2024-04-21 RX ORDER — IBUPROFEN 800 MG/1
800 TABLET ORAL EVERY 8 HOURS PRN
Status: DISCONTINUED | OUTPATIENT
Start: 2024-04-21 | End: 2024-04-22 | Stop reason: HOSPADM

## 2024-04-21 RX ORDER — ACETAMINOPHEN 500 MG
1000 TABLET ORAL EVERY 6 HOURS PRN
Status: DISCONTINUED | OUTPATIENT
Start: 2024-04-21 | End: 2024-04-22 | Stop reason: HOSPADM

## 2024-04-21 RX ORDER — VITAMIN A ACETATE, BETA CAROTENE, ASCORBIC ACID, CHOLECALCIFEROL, .ALPHA.-TOCOPHEROL ACETATE, DL-, THIAMINE MONONITRATE, RIBOFLAVIN, NIACINAMIDE, PYRIDOXINE HYDROCHLORIDE, FOLIC ACID, CYANOCOBALAMIN, CALCIUM CARBONATE, FERROUS FUMARATE, ZINC OXIDE, CUPRIC OXIDE 3080; 12; 120; 400; 1; 1.84; 3; 20; 22; 920; 25; 200; 27; 10; 2 [IU]/1; UG/1; MG/1; [IU]/1; MG/1; MG/1; MG/1; MG/1; MG/1; [IU]/1; MG/1; MG/1; MG/1; MG/1; MG/1
1 TABLET, FILM COATED ORAL
Status: DISCONTINUED | OUTPATIENT
Start: 2024-04-21 | End: 2024-04-22 | Stop reason: HOSPADM

## 2024-04-21 RX ORDER — SIMETHICONE 125 MG
125 TABLET,CHEWABLE ORAL 4 TIMES DAILY PRN
Status: DISCONTINUED | OUTPATIENT
Start: 2024-04-21 | End: 2024-04-22 | Stop reason: HOSPADM

## 2024-04-21 RX ORDER — SODIUM CHLORIDE, SODIUM LACTATE, POTASSIUM CHLORIDE, CALCIUM CHLORIDE 600; 310; 30; 20 MG/100ML; MG/100ML; MG/100ML; MG/100ML
INJECTION, SOLUTION INTRAVENOUS PRN
Status: DISCONTINUED | OUTPATIENT
Start: 2024-04-21 | End: 2024-04-22 | Stop reason: HOSPADM

## 2024-04-21 RX ADMIN — ACETAMINOPHEN 1000 MG: 500 TABLET, FILM COATED ORAL at 10:19

## 2024-04-21 RX ADMIN — OXYTOCIN 20 UNITS: 10 INJECTION, SOLUTION INTRAMUSCULAR; INTRAVENOUS at 03:55

## 2024-04-21 RX ADMIN — ACETAMINOPHEN 1000 MG: 500 TABLET, FILM COATED ORAL at 20:40

## 2024-04-21 RX ADMIN — PRENATAL WITH FERROUS FUM AND FOLIC ACID 1 TABLET: 3080; 920; 120; 400; 22; 1.84; 3; 20; 10; 1; 12; 200; 27; 25; 2 TABLET ORAL at 10:19

## 2024-04-21 RX ADMIN — IBUPROFEN 800 MG: 800 TABLET, FILM COATED ORAL at 15:25

## 2024-04-21 RX ADMIN — OXYTOCIN 2 MILLI-UNITS/MIN: 10 INJECTION, SOLUTION INTRAMUSCULAR; INTRAVENOUS at 01:00

## 2024-04-21 RX ADMIN — OXYTOCIN 125 ML/HR: 10 INJECTION, SOLUTION INTRAMUSCULAR; INTRAVENOUS at 05:28

## 2024-04-21 RX ADMIN — DOCUSATE SODIUM 100 MG: 100 CAPSULE, LIQUID FILLED ORAL at 15:25

## 2024-04-21 RX ADMIN — IBUPROFEN 800 MG: 800 TABLET, FILM COATED ORAL at 06:10

## 2024-04-21 ASSESSMENT — PAIN DESCRIPTION - PAIN TYPE
TYPE: ACUTE PAIN

## 2024-04-21 ASSESSMENT — PAIN SCALES - GENERAL: PAIN_LEVEL: 0

## 2024-04-21 NOTE — LACTATION NOTE
This note was copied from a baby's chart.  Initial Lactation Visit:    ITZEL Michael, states she is formula feeding. She would like to go over her breast pump with lactation tomorrow, though. She has a [employer] personal pump in box at bedside. She did not wish to review pump at time of visit.     Encouraged her to reach out via bedside RN tomorrow between 0700 - 1500 if she would like LC to review pump with her, or if she would like to attempt latch.     She denies breast/nipple discomfort.

## 2024-04-21 NOTE — PROGRESS NOTES
19 Y.O.  EDC 24 From [place] has prenatal care at an OBGYN at [facility]. Phone [phone] . Pt wants to deliver here. Records requested. FOB [place] at bedside. Expecting a baby girl Manuel. Pt denies GDM, GHTN, H/A, No blurry vision. Reports nausea, denies vomiting, epigastric pain, edema, Reports late prenatal care at 5 months. Pt reports normal anatomy scan.  Denies smoking, ETOH, or drug use. NKDA.     C/o UC's q 5 mins gaining in frequency and intensity. Pain 6/10.   GBS negative per pt.     Pt denies vaginal bleeding, LOF, reports postive fetal movement. EFM & [place] applied. Clean catch urine specimen obtained.      SVE 3/90/-2/vertex/mid position.    Report to Jaylin FINNEY RN.

## 2024-04-21 NOTE — ED PROVIDER NOTES
Emergency Obstetric Consultation     Date of Service  2024    Reason for Consultation  Chief Complaint   Patient presents with    Contractions       History of Presenting Illness  19 y.o. female who presented 2024 with Reason for consult: contractionsPatient presents to OB ED for complaints of abdominal pain consistent with contractions over the past day. She reports that she has received regular prenatal care at [facility] but was planning to deliver at [facility] the entire pregnancy.  She reports no LOF or vaginal bleeding. Eating and drinking without issue.   She is accompanied by her partner and FOB at bedside.   Contractions: Onset was 13-24 hours ago.    Perceived severity is moderate.    Fetal activity: Perceived fetal activity is normal.    Prenatal complications: Late prenatal care    .    Review of Systems  Review of Systems   All other systems reviewed and are negative.      Obstetric History    OB History    Para Term  AB Living   1             SAB IAB Ectopic Molar Multiple Live Births                    # Outcome Date GA Lbr Constantino/2nd Weight Sex Delivery Anes PTL Lv   1 Current                Gynecologic History  No LMP recorded. Patient is pregnant.    Medical History  History reviewed. No pertinent past medical history.    Surgical History   has a past surgical history that includes other.    Family History  family history includes Other in her father; Psychiatric Illness in her father; Thyroid in her mother.    Social History   reports that she has never smoked. She has never used smokeless tobacco. She reports that she does not currently use alcohol. She reports that she does not currently use drugs after having used the following drugs: Inhaled.    Medications  Medications Prior to Admission   Medication Sig Dispense Refill Last Dose    acetaminophen (TYLENOL) 325 MG Tab Take 650 mg by mouth every four hours as needed.   2024 at 0800    Prenatal MV-Min-Fe Fum-FA-DHA  "(PRENATAL 1 PO) Take  by mouth.   2024 at 0800    tamsulosin (FLOMAX) 0.4 MG capsule Take 1 Capsule by mouth 1/2 hour after breakfast. 10 Capsule 0     ibuprofen (MOTRIN) 600 MG Tab Take 1 Tablet by mouth every 6 hours as needed for Mild Pain. 30 Tablet 0        Allergies  No Known Allergies    Physical Exam  Maternal Exam:  Uterine Assessment: Contraction strength is moderate.  Contraction frequency is regular.   Abdomen: Patient reports generalized tenderness.   Estimated fetal weight is 3100.    Fetal presentation: vertex  Introitus: Normal vulva. Normal vagina.  Pelvis: adequate for delivery.    Cervix: Per RN 3/90/-2Abdominal:      Tenderness: There is generalized abdominal tenderness.   Genitourinary:     General: Normal vulva.         Laboratory  Recent Labs     24   WBC 17.3*   RBC 4.45   HEMOGLOBIN 12.2   HEMATOCRIT 37.1   MCV 83.4   MCH 27.4   MCHC 32.9   RDW 53.3*   PLATELETCT 296   MPV 9.7              No results for input(s): \"NTPROBNP\" in the last 72 hours.           Assessment & Plan  Assessment:  Early latent labor.   Membrane status: intact.   Fetal well-being: normal.   1. 20 yo   2. Cat I FHR tracing  3. Abdominal Pain    Plan:  1. Recheck cervix in 1 hour.           DIANA Camp    "

## 2024-04-21 NOTE — PROGRESS NOTES
2000 Report received from Jaylin ANDERSON RN. Care assumed. Admit orders received.     2015 Pt ambulated from LDA3 to labor room 216. EFM x2 applied. Pt is requesting an epidural as soon as possible for pain management options.     2050 Bedside report given to Rosalia GOLDSTEIN RN. POC discussed. Care relinquished.

## 2024-04-21 NOTE — PROGRESS NOTES
1000: Report received from Douglas SCHUSTER. Patient transferred to unit in wheelchair with infant in arms and FOB at bedside. Patient transferred from wheelchair to hospital bed, tolerated transfer well. Assessment completed, fundus is firm at one below umbilicus with light lochia rubra. Patient reports 4/10 pain at this time, medication to be given. IV site saline locked, postpartum oxytocin infusion completed prior to transfer. Patient oriented to call light, emergency push button, infant crib, bulb suction, and infant intake output sheet. Plan of care discussed, patient verbalized understanding.

## 2024-04-21 NOTE — ANESTHESIA PROCEDURE NOTES
Epidural Block    Date/Time: 4/20/2024 9:36 PM    Performed by: Joann Baca M.D.  Authorized by: Joann Baca M.D.    Patient Location:  OB  Start Time:  4/20/2024 9:36 PM  End Time:  4/20/2024 9:40 PM  Reason for Block: labor analgesia    patient identified, IV checked, site marked, risks and benefits discussed, surgical consent, monitors and equipment checked and pre-op evaluation    Patient Position:  Sitting  Prep: ChloraPrep, patient draped and sterile technique    Monitoring:  Blood pressure, continuous pulse oximetry and heart rate  Approach:  Midline  Location:  L3-L4  Injection Technique:  LIANNE saline  Skin infiltration:  Lidocaine  Strength:  1%  Dose:  2ml  Needle Type:  Tuohy  Needle Gauge:  17 G  Needle Length:  3.5 in  Loss of resistance::  5.5  Catheter Size:  19 G  Catheter at Skin Depth:  11  Test Dose Result:  Negative  Events: paresthesia-transient/resolved     Transient right-sided paresthesia that resolved with redirection. Pt tolerated procedure well.

## 2024-04-21 NOTE — PROGRESS NOTES
"S: Pt is laying in bed upon entry to room. Patient is s/p epidural placement. Discussed AROM if SVE is appropriate. Partner at bedside. .      O:    Vitals:    24 1823 24 18224   BP:  129/74 135/74   Pulse:  73 77   Resp:  16 19   Temp:  36.6 °C (97.8 °F) 36.3 °C (97.4 °F)   TempSrc:  Temporal Temporal   SpO2:  93%    Weight: 97.5 kg (215 lb)     Height: 1.727 m (5' 8\")             FHTs:  Baseline 135, pos accels, no decels, moderate variability        Twilight: Contractions q 1-3 minutes, moderate to palpation        SVE: /-1  AROM completed with small amount of     A/P:    1.  IUP @ 39w5d   2.  Cat I FHTs    3.  Early Labor-  discussed with patient labor expectations. Will monitor and consider pitocin for augmentation.  4.  Anticipate .     LOUANN Camp, [place]    "

## 2024-04-21 NOTE — ANESTHESIA POSTPROCEDURE EVALUATION
Patient: Michael Armenta Baldwin    Procedure Summary       Date: 04/20/24 Room / Location:     Anesthesia Start: 2131 Anesthesia Stop: 04/21/24 0352    Procedure: Labor Epidural Diagnosis:     Scheduled Providers:  Responsible Provider: Joann Baca M.D.    Anesthesia Type: epidural ASA Status: 2            Final Anesthesia Type: epidural  Last vitals  BP   Blood Pressure: 119/60    Temp   36.7 °C (98 °F)    Pulse   96   Resp   19    SpO2   97 %      Anesthesia Post Evaluation    Patient location during evaluation: bedside  Patient participation: complete - patient participated  Level of consciousness: awake and alert  Pain score: 0    Airway patency: patent  Anesthetic complications: no  Cardiovascular status: hemodynamically stable  Respiratory status: acceptable  Hydration status: euvolemic    PONV: none          No notable events documented.     Nurse Pain Score: 0 (NPRS)

## 2024-04-21 NOTE — ANESTHESIA PREPROCEDURE EVALUATION
Date: 24  Procedure: Labor Epidural       20 yo  kirkpatrick IUP at 39-4 requesting epidural for labor.    Plt 296  Relevant Problems   No relevant active problems       Physical Exam    Airway   Mallampati: II  TM distance: >3 FB  Neck ROM: full       Cardiovascular - normal exam  Rhythm: regular  Rate: normal  (-) murmur     Dental - normal exam           Pulmonary - normal exam  Breath sounds clear to auscultation     Abdominal    Neurological - normal exam                   Anesthesia Plan    ASA 2       Plan - epidural   Neuraxial block will be labor analgesia                  Pertinent diagnostic labs and testing reviewed    Informed Consent:    Anesthetic plan and risks discussed with patient.

## 2024-04-21 NOTE — L&D DELIVERY NOTE
Admit Date:   24      Pregnancy Complications: none  Medical Induction of Labor: no  GBS: negative  Anesthesia: epidural  Gestational Age at delivery: 39.5    Patient  progressed well after AROM and small amount of Pitocin for augmentation to deliver viable female infant in KENZIE position at 0352 with coached pushing efforts. Nuchal x1 reduced after delivery and infant placed to maternal abdomen where she was dried and stimulated. A spontaneous cry was noted. Apgar 8 at one minute and 9 at five minutes.      Pitocin infused via IV bolus. After delayed cord clamping, cord was doubly clamped and cut by FOB. Signs of placenta separation noted and gentle cord traction was completed. Intact placenta was delivered spontaneously at 0409 where 3VC was noted. EBL 450mL. Fundus firm with minimal massage. Inspection of vulva and vagina was completed and 2nd degree laceration noted and repaired in usual fashion with 2-0 and 3-0 vicryl. Further inspection revealed right side periclitoral laceration that was repaired with 4-0 vicryl in usual fashion. Good hemostasis and approximation achieved after repairs. Routine postpartum care was initiated as mother and infant stable. Education provided to patient and her partner regarding repairs. Infant weight is 3805 grams.    Delivery of infant and placenta completed by LUDY KAHN under my direct supervision.     Delivery Complications: none    Leodan LEW CNM/ Dr. Conde, Attending

## 2024-04-21 NOTE — H&P
Admission History and Physical      Michael Baldwin is a 19 y.o. female  at 39w4d who presents for abdominal pain. Pt states she started having pain in her lower abdomen and buttocks approximately 24 hours ago that have progressively gotten more intense and closer together. Today she reports that her contractions are 2 minutes apart for the past few hours. She denies VB and LOF. Reports good fetal movement. She states that she received prenatal care at [facility] starting at 20 weeks and records are available for review. She denies alcohol, tobacco or illicit drug use. She states she takes prenatal vitamin and stopped taking her iron supplement last week.    Subjective:     positive Feels pain   negative for LOF  negative for vaginal bleeding.   positive for fetal movement        ROS: Pertinent items are noted in HPI.    History reviewed. No pertinent past medical history.  History reviewed. No pertinent surgical history.  OB History    Para Term  AB Living   1             SAB IAB Ectopic Molar Multiple Live Births                    # Outcome Date GA Lbr Constantino/2nd Weight Sex Delivery Anes PTL Lv   1 Current              Social History     Socioeconomic History    Marital status: Single     Spouse name: Not on file    Number of children: Not on file    Years of education: Not on file    Highest education level: Not on file   Occupational History    Not on file   Tobacco Use    Smoking status: Never    Smokeless tobacco: Never   Substance and Sexual Activity    Alcohol use: Not on file    Drug use: Not on file    Sexual activity: Not on file   Other Topics Concern    Behavioral problems Not Asked    Interpersonal relationships Not Asked    Sad or not enjoying activities Not Asked    Suicidal thoughts Not Asked    Poor school performance Not Asked    Reading difficulties Not Asked    Speech difficulties Not Asked    Writing difficulties Not Asked    Inadequate sleep Not Asked    Excessive  TV viewing Not Asked    Excessive video game use Not Asked    Inadequate exercise Not Asked    Sports related Not Asked    Poor diet Not Asked    Family concerns for drug/alcohol abuse Not Asked    Poor oral hygiene Not Asked    Bike safety Not Asked    Family concerns vehicle safety Not Asked   Social History Narrative    Not on file     Social Determinants of Health     Financial Resource Strain: Not on file   Food Insecurity: Not on file   Transportation Needs: Not on file   Physical Activity: Not on file   Stress: Not on file   Social Connections: Not on file   Intimate Partner Violence: Not on file   Housing Stability: Not on file     Allergies: Patient has no known allergies.    Current Facility-Administered Medications:     LR infusion, , Intravenous, Continuous, Carrissia Destini, OGNP    lidocaine (XYLOCAINE) 1%  injection, 20 mL, Subcutaneous, Once PRN, Carrissia Destini, OGNP    terbutaline (Brethine) injection 0.25 mg, 0.25 mg, Subcutaneous, Once PRN, Carrissia Destini, OGNP    oxytocin (Pitocin) infusion bolus (for post delivery), 20 Units, Intravenous, Once **FOLLOWED BY** oxytocin (Pitocin) infusion (for post delivery), 125 mL/hr, Intravenous, Continuous, Carrissia Destini, OGNP    oxytocin (Pitocin) injection 10 Units, 10 Units, Intramuscular, Once PRN, Carrissia Destini, OGNP    ibuprofen (Motrin) tablet 800 mg, 800 mg, Oral, Once PRN, Carrissia Destini, OGNP    acetaminophen (Tylenol) tablet 1,000 mg, 1,000 mg, Oral, Once PRN, Carrissia Destini, OGNP    fentaNYL (Sublimaze) injection 50 mcg, 50 mcg, Intravenous, Q HOUR PRN **OR** fentaNYL (Sublimaze) injection 100 mcg, 100 mcg, Intravenous, Q HOUR PRN, Carrissia Destini, OGNP    ondansetron (Zofran ODT) dispertab 4 mg, 4 mg, Oral, Q6HRS PRN **OR** ondansetron (Zofran) syringe/vial injection 4 mg, 4 mg, Intravenous, Q6HRS PRN, Carrissia Destini, OGNP    Prenatal care with [facility] starting at 20 with no problems  Patient Active Problem List     "Diagnosis Date Noted    Labor and delivery indication for care or intervention 04/20/2024       Last US at 20 weeks and noted to be normal per patient. (Request made)        Objective:      /74   Pulse 73   Temp 36.6 °C (97.8 °F) (Temporal)   Resp 16   Ht 1.727 m (5' 8\")   Wt 97.5 kg (215 lb)   SpO2 93%     General:   alert, cooperative   Skin:   normal   HEENT:  EOMI   Lungs:   CTA bilateral   Heart:   S1, S2 normal, no murmur, click, rub or gallop, regular rate and rhythm, brisk carotid upstroke without bruits, peripheral pulses very brisk, chest is clear without rales or wheezing, no pedal edema, no JVD, no hepatosplenomegaly   Abdomen:   gravid, NT   EFW:  3100grams   Pelvis:  adequate, diagnonal conjugate not met   FHT:  135 BPM   Uterine Size: S=D   Presentations: Cephalic   Cervix:     Dilation: 4    Effacement: 90    Station:  -1    Consistency: Soft    Position: Middle     Lab Review  Lab:   Blood type: A+     No results found for this or any previous visit (from the past 5880 hour(s)).       Assessment:   Michael Ortega [facility] [place] Baldwin at 39w4d  Labor status: Early latent labor., Adequate uterine activity - intensity and frequency., and Satisfactory labor progress.  Obstetrical history significant for   Patient Active Problem List    Diagnosis Date Noted    Labor and delivery indication for care or intervention 04/20/2024   .      Plan:     1. Admit to L&D  2. GBS negative per patient and records  3. Desires epidural for pain relief. Continue with position changes until received.   4. Plan at this time is to admit to labor & delivery unit. Consider AROM for augmentation if appropriate.       CFeaster EVERETT/ Dr. Conde Attending    "

## 2024-04-21 NOTE — PROGRESS NOTES
- Report received from LANDEN Larson. POC discussed.      - Test dose for epidural placed without complications.      - Pt resting comfortably.     235 - Report given to provider regarding updated SVE. Orders received to initiate pushing in 30 minutes.     0314 - Pushing initiated.     0343 - Provider called to bedside for delivery.    0345 - Provider at bedside.     0352 -  of viable infant female. 8/9 APGARs, x1 nuchal. See provider note.     0518 - Repair completed. Pt resting comfortably with infant skin to skin.     700 - Report given to LANDEN Cole. POC discussed.

## 2024-04-21 NOTE — ANESTHESIA TIME REPORT
Anesthesia Start and Stop Event Times       Date Time Event    4/20/2024 2055 Ready for Procedure     2131 Anesthesia Start    4/21/2024 0352 Anesthesia Stop          Responsible Staff  04/20/24 to 04/21/24      Name Role Begin End    Joann Baca M.D. Anesth 2131 0352          Overtime Reason:  no overtime (within assigned shift)    Comments:

## 2024-04-22 ENCOUNTER — PHARMACY VISIT (OUTPATIENT)
Dept: PHARMACY | Facility: MEDICAL CENTER | Age: 19
End: 2024-04-22
Payer: COMMERCIAL

## 2024-04-22 VITALS
SYSTOLIC BLOOD PRESSURE: 124 MMHG | WEIGHT: 215 LBS | RESPIRATION RATE: 16 BRPM | HEIGHT: 68 IN | OXYGEN SATURATION: 97 % | TEMPERATURE: 98.2 F | BODY MASS INDEX: 32.58 KG/M2 | HEART RATE: 82 BPM | DIASTOLIC BLOOD PRESSURE: 70 MMHG

## 2024-04-22 LAB
ERYTHROCYTE [DISTWIDTH] IN BLOOD BY AUTOMATED COUNT: 54.4 FL (ref 35.9–50)
HCT VFR BLD AUTO: 28.7 % (ref 37–47)
HGB BLD-MCNC: 9.3 G/DL (ref 12–16)
MCH RBC QN AUTO: 27 PG (ref 27–33)
MCHC RBC AUTO-ENTMCNC: 32.4 G/DL (ref 32.2–35.5)
MCV RBC AUTO: 83.4 FL (ref 81.4–97.8)
PLATELET # BLD AUTO: 224 K/UL (ref 164–446)
PMV BLD AUTO: 9.6 FL (ref 9–12.9)
RBC # BLD AUTO: 3.44 M/UL (ref 4.2–5.4)
WBC # BLD AUTO: 12.2 K/UL (ref 4.8–10.8)

## 2024-04-22 PROCEDURE — 85027 COMPLETE CBC AUTOMATED: CPT

## 2024-04-22 PROCEDURE — 36415 COLL VENOUS BLD VENIPUNCTURE: CPT

## 2024-04-22 PROCEDURE — A9270 NON-COVERED ITEM OR SERVICE: HCPCS | Performed by: ADVANCED PRACTICE MIDWIFE

## 2024-04-22 PROCEDURE — RXMED WILLOW AMBULATORY MEDICATION CHARGE

## 2024-04-22 PROCEDURE — 700102 HCHG RX REV CODE 250 W/ 637 OVERRIDE(OP): Performed by: ADVANCED PRACTICE MIDWIFE

## 2024-04-22 RX ORDER — PSEUDOEPHEDRINE HCL 30 MG
100 TABLET ORAL 2 TIMES DAILY PRN
Qty: 60 CAPSULE | Refills: 0 | Status: SHIPPED | OUTPATIENT
Start: 2024-04-22

## 2024-04-22 RX ORDER — IBUPROFEN 800 MG/1
800 TABLET ORAL EVERY 8 HOURS PRN
Qty: 30 TABLET | Refills: 0 | Status: SHIPPED | OUTPATIENT
Start: 2024-04-22

## 2024-04-22 RX ORDER — ACETAMINOPHEN 500 MG
500-1000 TABLET ORAL EVERY 6 HOURS PRN
Qty: 30 TABLET | Refills: 0 | Status: SHIPPED | OUTPATIENT
Start: 2024-04-22

## 2024-04-22 RX ORDER — FERROUS SULFATE 325(65) MG
325 TABLET ORAL DAILY
Qty: 60 TABLET | Refills: 0 | Status: SHIPPED | OUTPATIENT
Start: 2024-04-22

## 2024-04-22 RX ADMIN — IBUPROFEN 800 MG: 800 TABLET, FILM COATED ORAL at 14:04

## 2024-04-22 RX ADMIN — IBUPROFEN 800 MG: 800 TABLET, FILM COATED ORAL at 01:33

## 2024-04-22 RX ADMIN — ACETAMINOPHEN 1000 MG: 500 TABLET, FILM COATED ORAL at 02:55

## 2024-04-22 RX ADMIN — PRENATAL WITH FERROUS FUM AND FOLIC ACID 1 TABLET: 3080; 920; 120; 400; 22; 1.84; 3; 20; 10; 1; 12; 200; 27; 25; 2 TABLET ORAL at 08:51

## 2024-04-22 ASSESSMENT — EDINBURGH POSTNATAL DEPRESSION SCALE (EPDS)
I HAVE BEEN ABLE TO LAUGH AND SEE THE FUNNY SIDE OF THINGS: AS MUCH AS I ALWAYS COULD
I HAVE BEEN SO UNHAPPY THAT I HAVE HAD DIFFICULTY SLEEPING: NOT AT ALL
THINGS HAVE BEEN GETTING ON TOP OF ME: NO, I HAVE BEEN COPING AS WELL AS EVER
THE THOUGHT OF HARMING MYSELF HAS OCCURRED TO ME: NEVER
I HAVE LOOKED FORWARD WITH ENJOYMENT TO THINGS: AS MUCH AS I EVER DID
I HAVE BLAMED MYSELF UNNECESSARILY WHEN THINGS WENT WRONG: NOT VERY OFTEN
I HAVE BEEN SO UNHAPPY THAT I HAVE BEEN CRYING: NO, NEVER
I HAVE FELT SCARED OR PANICKY FOR NO GOOD REASON: NO, NOT MUCH
I HAVE BEEN ANXIOUS OR WORRIED FOR NO GOOD REASON: HARDLY EVER
I HAVE FELT SAD OR MISERABLE: NO, NOT AT ALL

## 2024-04-22 ASSESSMENT — PAIN DESCRIPTION - PAIN TYPE
TYPE: ACUTE PAIN

## 2024-04-22 NOTE — DISCHARGE INSTRUCTIONS
Pelvic rest x6 weeks  Return for follow up visit in 5 weeks.  Call or come to ED for: heavy vaginal bleeding, fever >100.4, severe abdominal pain, severe headache, chest pain, shortness of breath,  N/V, or other concerns.     PATIENT DISCHARGE EDUCATION INSTRUCTION SHEET    REASONS TO CALL YOUR OBSTETRICIAN  Persistent fever, shaking, chills (Temperature higher than 100.4) may indicate you have an infection  Heavy bleeding: soaking more than 1 pad per hour; Passing clots an egg-sized clot or bigger may mean you have an postpartum hemorrhage  Foul odor from vagina or bad smelling or discolored discharge or blood  Breast infection (Mastitis symptoms); breast pain, chills, fever, redness or red streaks, may feel flu like symptoms  Urinary pain, burning or frequency  Incision that is not healing, increased redness, swelling, tenderness or pain, or any pus from episiotomy or  site may mean you have an infection  Redness, swelling, warmth, or painful to touch in the calf area of your leg may mean you have a blood clot  Severe or intensified depression, thoughts or feelings of wanting to hurt yourself or someone else   Pain in chest, obstructed breathing or shortness of breath (trouble catching your breath) may mean you are having a postpartum complication. Call your provider immediately   Headache that does not get better, even after taking medicine, a bad headache with vision changes or pain in the upper right area of your belly may mean you have high blood pressure or post birth preeclampsia. Call your provider immediately    HAND WASHING  All family and friends should wash their hands:  Before and after holding the baby  Before feeding the baby  After using the restroom or changing the baby's diaper    WOUND CARE  Ask your physician for additional care instructions. In general:  Encourage family to help participate in care of the  to allow rest and mom time to heal  Episiotomy/Laceration  May use  alison-spray bottle, witch hazel pads and dermaplast spray for comfort  Use alison-spray bottle after urinating to cleanse perineal area  To prevent burning during urination spray alison-water bottle on labial area   Pat perineal area dry until episiotomy/laceration is healed  Continue to use alison-bottle until bleeding stops as needed  If have a 2nd degree laceration or greater, a Sitz bath can offer relief from soreness, burning, and inflammation   Sitz Bath   Sit in 6 inches of warm water and soak laceration as needed until the laceration heals    VAGINAL CARE AND BLEEDING  Nothing inside vagina for 6 weeks:   No sexual intercourse, tampons or douching  Bleeding may continue for 2-4 weeks. Amount and color may vary  Soaking 1 pad or more in an hour for several hours is considered heavy bleeding  Passing large egg sized blood clots can be concerning  If you feel like you have heavy bleeding or are having increasing amount of blood clots call your Obstetrician immediately  If you begin feeling faint upon standing, feeling sick to your stomach, have clammy skin, a really fast heartbeat, have chills, start feeling confused, dizzy, sleepy or weak, or feeling like you're going to faint call your Obstetrician immediately    HYPERTENSION   Preeclampsia or gestational hypertension are types of high blood pressure that only pregnant women can get. It is important for you to be aware of symptoms to seek early intervention and treatment. If you have any of these symptoms immediately call your Obstetrician    Vision changes or blurred vision   Severe headache or pain that is unrelieved with medication and will not go away  Persistent pain in upper abdomen or shoulder   Increased swelling of face, feet, or hands  Difficulty breathing or shortness of breath at rest  Urinating less than usual    URINATION AND BOWEL MOVEMENTS  Eating more fiber (bran cereal, fruits, and vegetables) and drinking plenty of fluids will help to avoid  "constipation  Urinary frequency and urgency after childbirth is normal  If you experience any urinary pain, burning or frequency call your provider    BIRTH CONTROL  It is possible to become pregnant at any time after delivery and while breastfeeding  Plan to discuss a method of birth control with your physician at your post delivery follow up visit    POSTPARTUM BLUES  During the first few days after birth, you may experience a sense of the \"blues\" which may include impatience, irritability or even crying. These feelings come and go quickly. However, as many as 1 in 10 women experience emotional symptoms known as postpartum depression.     POSTPARTUM DEPRESSION  May start as early as the second or third day after delivery or take several weeks or months to develop. Symptoms of \"blues\" are present, but are more intense: Crying spells; loss of appetite; feelings of hopelessness or loss of control; fear of touching the baby; over concern or no concern at all about the baby; little or no concern about your own appearance/caring for yourself; and/or inability to sleep or excessive sleeping. Contact your Obstetrician if you are experiencing any of these symptoms     PREVENTING SHAKEN BABY  If you are angry or stressed, PUT THE BABY IN THE CRIB, step away, take some deep breaths, and wait until you are calm to care for the baby. DO NOT SHAKE THE BABY. You are not alone, call a supporter for help.  [facility] 24/7 crisis call line ([phone]) or (1-145.592.9306)  You can also text them, text \"ANSWER\" (221027)  "

## 2024-04-22 NOTE — DISCHARGE SUMMARY
Discharge Summary:     Date of Admission: 2024  Date of Discharge: 24    Admitting diagnosis:    1. Pregnancy @ 39w5d  2. Spontaneous contractions    Discharge Diagnosis:   1. Status post vaginal, spontaneous.    History reviewed. No pertinent past medical history.  OB History    Para Term  AB Living   1 1 1     1   SAB IAB Ectopic Molar Multiple Live Births           0 1      # Outcome Date GA Lbr Constantino/2nd Weight Sex Delivery Anes PTL Lv   1 Term 24 39w5d / 01:18 3.805 kg (8 lb 6.2 oz) F Vag-Spont EPI N KARI     Past Surgical History:   Procedure Laterality Date    OTHER      wisdom teeth     Patient has no known allergies.    Patient Active Problem List   Diagnosis    Labor and delivery indication for care or intervention    Status post delivery at term       Hospital Course:   Pt is a 19 y.o. now  who presented on 2024 for spontaneous contractions. Labor induction performed with pitocin. Epidural anesthesia was utilized with good effect on pain. Single female infant was delivered via  at 0352 on 24. Apgars 8, 9 at 1 and 5 minutes respectively.  ml.     Postpartum course notable for early ambulation, well managed pain, tolerance of diet, spontaneous voiding, and appropriate feeding of infant. She has remained afebrile and blood pressure has been well controlled. All maternal questions and concerns addressed    Physical Exam:  Temp:  [36.8 °C (98.2 °F)-37.3 °C (99.2 °F)] 36.8 °C (98.2 °F)  Pulse:  [78-92] 82  Resp:  [16-18] 16  BP: (124-131)/(70-79) 124/70  SpO2:  [96 %-98 %] 97 %  Physical Exam  General: well appearing, no apparent distress  Abdomen: soft, nontender, nondistended  Fundus: firm at level below umbilicus  Incision: not applicable, (vaginal delivery)  Perineum: Deferred  Extremities: symmetric, no peripheral edema, calves nontender    Current Facility-Administered Medications   Medication Dose    oxytocin (Pitocin) 0.02 Units/mL LR (induction  of labor)  0.5-20 pablo-units/min    lactated ringers infusion      docusate sodium (Colace) capsule 100 mg  100 mg    ibuprofen (Motrin) tablet 800 mg  800 mg    acetaminophen (Tylenol) tablet 1,000 mg  1,000 mg    measles, mumps and rubella vaccine (Mmr) injection 0.5 mL  0.5 mL    prenatal plus vitamin (Stuartnatal 1+1) 27-1 MG tablet 1 Tablet  1 Tablet    simethicone (Mylicon) chewable tablet 125 mg  125 mg    LR infusion      oxytocin (Pitocin) infusion (for post delivery)  125 mL/hr    ropivacaine 0.2 % (Naropin) injection         Recent Labs     24  2030 24  0457   WBC 17.3* 12.2*   RBC 4.45 3.44*   HEMOGLOBIN 12.2 9.3*   HEMATOCRIT 37.1 28.7*   MCV 83.4 83.4   MCH 27.4 27.0   MCHC 32.9 32.4   RDW 53.3* 54.4*   PLATELETCT 296 224   MPV 9.7 9.6       Activity/ Discharge Instructions::   Discharge to home  Pelvic Rest x 6 weeks  No heavy lifting x4 weeks  Call or come to ED for: heavy vaginal bleeding, fever >100.4, severe abdominal pain, severe headache, chest pain, shortness of breath,  N/V, or other concerns.      Follow up:  [facility] in [facility] weeks for vaginal delivery; 1 week for incision check for  delivery.     Discharge Meds:   Current Outpatient Medications   Medication Sig Dispense Refill    docusate sodium 100 MG Cap Take 100 mg by mouth 2 times a day as needed for Constipation. 60 Capsule 0    acetaminophen (TYLENOL) 500 MG Tab Take 1-2 Tablets by mouth every 6 hours as needed for Mild Pain, Moderate Pain or Fever. 30 Tablet 0    ibuprofen (MOTRIN) 800 MG Tab Take 1 Tablet by mouth every 8 hours as needed for Mild Pain, Moderate Pain, Fever or Headache. 30 Tablet 0    ferrous sulfate 325 (65 Fe) MG tablet Take 1 Tablet by mouth every day. 60 Tablet 0       Florian Hernandez MD, MPH  PGY-1, [facility]

## 2024-04-22 NOTE — DISCHARGE PLANNING
Meds-to-Beds: Discharge prescription orders listed below delivered to patient's bedside. RN Jacquie notified. Patient counseled. Patient elected to have payment for iron billed to patient account.        Current Outpatient Medications   Medication Sig Dispense Refill    docusate sodium 100 MG Cap Take 100 mg by mouth 2 times a day as needed for Constipation. 60 Capsule 0    acetaminophen (TYLENOL) 500 MG Tab Take 1-2 Tablets by mouth every 6 hours as needed for Mild Pain, Moderate Pain or Fever. 30 Tablet 0    ibuprofen (MOTRIN) 800 MG Tab Take 1 Tablet by mouth every 8 hours as needed for Mild Pain, Moderate Pain, Fever or Headache. 30 Tablet 0    ferrous sulfate 325 (65 Fe) MG tablet Take 1 Tablet by mouth every day. 60 Tablet 0      Monika Mendoza, PharmD

## 2024-04-22 NOTE — LACTATION NOTE
This note was copied from a baby's chart.  Follow up:    MOB requested assistance with personal breastpump. Discussed basic set up, flange fit (25mm), general pump settings and encouraged to read instructions to guarantee proper set up. Discussed supply and demand of breastmilk and if breastmilk production is desired, frequent stimulation is necessary. If mob desires to pump and provide, pump for 15min q3hrs.  Reviewed and given with CDC guidelines on how to store/prep breastmilk handout.     MOB requested assistance with latch,has been dripping colostrum since late last night.  Baby asleep in bassinet with pacifier.  With permission unswaddled and placed into football on right breast.  Provided pillows to support proper positioning.  Adjusted MOB hands to provide infant's ear, shoulder, hip alignment. Taught hand expression.  MOB hand expressed colostrum onto upper lip.  Baby fussy and frantic, unable to achieve latch.      Suggested to start skin to skin so baby can familiarize with the chest, attempt to breastfeed when baby calm and able to coordinate.  If baby frantic and unable to latch after several attempts, discontinue attempts to avoid aversion. Continue to supplement with formula or EBM q3hrs according to supplemental feeding guidelines.    Plan:  Frequent skin to skin.  Breastfeed if seeing early feeding cues during skin to skin.  Cont bottle supplementation q 3hr and attempt to latch after bottle feed while baby calm.    Follow up with [facility] for lactation support.

## 2024-04-22 NOTE — PROGRESS NOTES
Report received from AM RN at 1900. Patient sitting up in bed reporting 4/10 pain; medicated per MAR. Fundus firm; lochia scant. POC discussed with patient and SO at bedside. Call light within reach.

## 2024-04-22 NOTE — PROGRESS NOTES
2973- Bedside report received from LANDEN Simms.  Patient denied needs.  0845- Patient denied needs at this time.  1105- Patient assessment done.  Patient reported she is voiding without difficulty and passing flatus.  Patient denied dizziness and reported she is walking without difficulty.  Patient denied need for pain intervention.  Reviewed plan of care.  Patient verbalized understanding.  FOB at bedside.  Patient stated desire for discharge home today and was encouraged to read the written patient education/instruction sheet.  7200- Patient stated she read the written patient education/instruction sheet and has no questions.  Discharge instructions reviewed with patient who verbalized understanding and stated she has no questions.  Discharge paperwork signed by patient.  Awaiting FOB to come back to the room.  Patient will call when ready for escort out to vehicle.  7492- Patient stated that she is ready for discharge.  Patient discharged to home, no change noted in condition, via wheelchair with infant and FOB.

## 2024-04-22 NOTE — DISCHARGE PLANNING
Discharge Planning Assessment Post Partum    Reason for Referral: Assist with resources   Address: [address] [place] [id number]  Phone: [phone]  Type of Living Situation: stable housing   Mom Diagnosis: Pregnancy, vaginal delivery   Baby Diagnosis: [place]-39.5 weeks   Primary Language: English     Name of Baby: Tea Baldwin (: 24)  Father of the Baby: Suleman Armenta   Involved in baby’s care? Yes  Contact Information: [phone]    Prenatal Care: Yes-[facility]   Mom's PCP: No PCP listed   PCP for new baby: Pediatrician list provided     Support System: FOB and MOB's mother-Olamide Joseph   Coping/Bonding between mother & baby: Yes  Source of Feeding: breast and formula   Supplies for Infant: prepared for infant     Mom's Insurance: Medicaid FFS  Baby Covered on Insurance:Yes  Other children in the home/names & ages: first baby     Financial Hardship/Income: No   Mom's Mental status: alert and oriented   Services used prior to admit: Medicaid and plans to apply for [facility]     CPS History: No  Psychiatric History: No, MOB scored a 3 on the EPDS screen   Domestic Violence History: No  Drug/ETOH History: No    Resources Provided: pediatrician list, children and family resource list, post partum support and counseling resources, and diaper bank assistance resources   Referrals Made: diaper bank referral provided      Clearance for Discharge: Infant is cleared to discharge home with parents once medically cleared

## 2024-04-22 NOTE — CARE PLAN
The patient is Stable - Low risk of patient condition declining or worsening    Shift Goals  Clinical Goals: Free from pain  Patient Goals: Healthy mom and infant  Family Goals: support    Progress made toward(s) clinical / shift goals:  PRN PO medications provided adequate pain relief along with ice packs/tucks/spray      Problem: Pain - Standard  Goal: Alleviation of pain or a reduction in pain to the patient’s comfort goal  Outcome: Progressing     Problem: Knowledge Deficit - Postpartum  Goal: Patient will verbalize and demonstrate understanding of self and infant care  Outcome: Progressing       Patient is not progressing towards the following goals:      
The patient is Stable - Low risk of patient condition declining or worsening    Shift Goals  Clinical Goals: Maintain fundus firm, lochia light; pain management; discharge home  Patient Goals: discharge  Family Goals:     Progress made toward(s) clinical / shift goals:  MET  
The patient is Stable - Low risk of patient condition declining or worsening    Shift Goals  Clinical Goals: cervical change, category 1 fetal heart tracing  Patient Goals: healthy mom, healthy baby  Family Goals: support    Progress made toward(s) clinical / shift goals:    Problem: Knowledge Deficit - L&D  Goal: Patient and family/caregivers will demonstrate understanding of plan of care, disease process/condition, diagnostic tests and medications  Outcome: Progressing   Pt continually updated and educated on ongoing POC   Problem: Pain  Goal: Patient's pain will be alleviated or reduced to the patient’s comfort goal  Outcome: Progressing  Pt states she is experiencing adequate pain control        
The patient is Stable - Low risk of patient condition declining or worsening    Shift Goals  Clinical Goals: patient will remain clinically stable  Patient Goals:   Family Goals:     Progress made toward(s) clinical / shift goals:      Problem: Pain - Standard  Goal: Alleviation of pain or a reduction in pain to the patient’s comfort goal  Outcome: Progressing     Problem: Altered Physiologic Condition  Goal: Patient physiologically stable as evidenced by normal lochia, palpable uterine involution and vitals within normal limits  Outcome: Progressing     Patient is able to verbalize pain and request for pain management if needed. Patient is receiving PRN medication for management. Fundus is firm at one below umbilicus with scant lochia rubra.    Patient is not progressing towards the following goals:      
no concerns